# Patient Record
Sex: FEMALE | URBAN - METROPOLITAN AREA
[De-identification: names, ages, dates, MRNs, and addresses within clinical notes are randomized per-mention and may not be internally consistent; named-entity substitution may affect disease eponyms.]

---

## 2018-01-26 ENCOUNTER — TRANSFERRED RECORDS (OUTPATIENT)
Dept: HEALTH INFORMATION MANAGEMENT | Facility: CLINIC | Age: 28
End: 2018-01-26

## 2018-05-02 ENCOUNTER — DOCUMENTATION ONLY (OUTPATIENT)
Dept: LAB | Facility: CLINIC | Age: 28
End: 2018-05-02

## 2018-05-02 DIAGNOSIS — L85.3 ASTEATOSIS CUTIS: ICD-10-CM

## 2018-05-02 DIAGNOSIS — K59.00 CONSTIPATION: ICD-10-CM

## 2018-05-02 DIAGNOSIS — K29.01 ACUTE GASTRITIS WITH HEMORRHAGE: ICD-10-CM

## 2018-05-02 DIAGNOSIS — F41.9 ANXIETY HYPERVENTILATION: ICD-10-CM

## 2018-05-02 DIAGNOSIS — F45.8 ANXIETY HYPERVENTILATION: ICD-10-CM

## 2018-05-02 DIAGNOSIS — G31.84 MILD COGNITIVE IMPAIRMENT: ICD-10-CM

## 2018-05-02 DIAGNOSIS — B37.82 INTESTINAL CANDIDIASIS: ICD-10-CM

## 2018-05-02 DIAGNOSIS — R14.0 ABDOMINAL DISTENTION: Primary | ICD-10-CM

## 2018-05-02 DIAGNOSIS — R53.83 FATIGUE: ICD-10-CM

## 2018-05-02 LAB
ALBUMIN SERPL-MCNC: 4.1 G/DL (ref 3.4–5)
ALP SERPL-CCNC: 71 U/L (ref 40–150)
ALT SERPL W P-5'-P-CCNC: 26 U/L (ref 0–50)
ANION GAP SERPL CALCULATED.3IONS-SCNC: 7 MMOL/L (ref 3–14)
AST SERPL W P-5'-P-CCNC: 25 U/L (ref 0–45)
BASOPHILS # BLD AUTO: 0 10E9/L (ref 0–0.2)
BASOPHILS NFR BLD AUTO: 0.4 %
BILIRUB SERPL-MCNC: 0.3 MG/DL (ref 0.2–1.3)
BUN SERPL-MCNC: 7 MG/DL (ref 7–30)
CALCIUM SERPL-MCNC: 9 MG/DL (ref 8.5–10.1)
CHLORIDE SERPL-SCNC: 108 MMOL/L (ref 94–109)
CO2 SERPL-SCNC: 27 MMOL/L (ref 20–32)
CREAT SERPL-MCNC: 0.76 MG/DL (ref 0.52–1.04)
DIFFERENTIAL METHOD BLD: NORMAL
EOSINOPHIL # BLD AUTO: 0.1 10E9/L (ref 0–0.7)
EOSINOPHIL NFR BLD AUTO: 2.9 %
ERYTHROCYTE [DISTWIDTH] IN BLOOD BY AUTOMATED COUNT: 12.7 % (ref 10–15)
FERRITIN SERPL-MCNC: 28 NG/ML (ref 12–150)
GFR SERPL CREATININE-BSD FRML MDRD: >90 ML/MIN/1.7M2
GLUCOSE SERPL-MCNC: 93 MG/DL (ref 70–99)
HCT VFR BLD AUTO: 42.8 % (ref 35–47)
HGB BLD-MCNC: 14 G/DL (ref 11.7–15.7)
LYMPHOCYTES # BLD AUTO: 1.3 10E9/L (ref 0.8–5.3)
LYMPHOCYTES NFR BLD AUTO: 29.6 %
MCH RBC QN AUTO: 32.2 PG (ref 26.5–33)
MCHC RBC AUTO-ENTMCNC: 32.7 G/DL (ref 31.5–36.5)
MCV RBC AUTO: 98 FL (ref 78–100)
MONOCYTES # BLD AUTO: 0.4 10E9/L (ref 0–1.3)
MONOCYTES NFR BLD AUTO: 8.5 %
NEUTROPHILS # BLD AUTO: 2.6 10E9/L (ref 1.6–8.3)
NEUTROPHILS NFR BLD AUTO: 58.6 %
PLATELET # BLD AUTO: 248 10E9/L (ref 150–450)
POTASSIUM SERPL-SCNC: 4.3 MMOL/L (ref 3.4–5.3)
PROT SERPL-MCNC: 7.3 G/DL (ref 6.8–8.8)
RBC # BLD AUTO: 4.35 10E12/L (ref 3.8–5.2)
SODIUM SERPL-SCNC: 142 MMOL/L (ref 133–144)
WBC # BLD AUTO: 4.5 10E9/L (ref 4–11)

## 2018-05-02 PROCEDURE — 36415 COLL VENOUS BLD VENIPUNCTURE: CPT

## 2018-05-02 PROCEDURE — 99000 SPECIMEN HANDLING OFFICE-LAB: CPT

## 2018-05-02 PROCEDURE — 84630 ASSAY OF ZINC: CPT | Mod: 90

## 2018-05-02 PROCEDURE — 80053 COMPREHEN METABOLIC PANEL: CPT

## 2018-05-02 PROCEDURE — 82728 ASSAY OF FERRITIN: CPT

## 2018-05-02 PROCEDURE — 85025 COMPLETE CBC W/AUTO DIFF WBC: CPT

## 2018-05-02 PROCEDURE — 82525 ASSAY OF COPPER: CPT | Mod: 90

## 2018-05-02 NOTE — PROGRESS NOTES
Patient has lab appointment today at 8:45am, but no orders.  Please place orders, if needed.  Thank you Lab,

## 2018-05-05 LAB
COPPER SERPL-MCNC: 82 UG/DL (ref 80–155)
ZINC SERPL-MCNC: 76 UG/DL (ref 60–120)

## 2018-05-11 ENCOUNTER — OFFICE VISIT (OUTPATIENT)
Dept: OTOLARYNGOLOGY | Facility: CLINIC | Age: 28
End: 2018-05-11
Payer: COMMERCIAL

## 2018-05-11 DIAGNOSIS — R13.11 ORAL PHASE DYSPHAGIA: ICD-10-CM

## 2018-05-11 DIAGNOSIS — Q30.2 CLEFT LIP NASAL DEFORMITY: Primary | ICD-10-CM

## 2018-05-11 DIAGNOSIS — Q36.9 CLEFT LIP NASAL DEFORMITY: Primary | ICD-10-CM

## 2018-05-11 NOTE — LETTER
5/11/2018     RE: Raya Allen  [address]  [facility] [address]     Dear Colleague,    Thank you for referring your patient, Raya Allen, to the [facility]. Please see a copy of my visit note below.    Facial Plastic and Reconstructive Surgery Consultation    Referring Provider:   Lee Gallo MD  [address] [facility] [place] [address]    HPI:   I had the pleasure of seeing Raya Allen today in clinic for consultation for cleft lip deformity.    Raya Allen is a 28 year old female with a history of unilateral cleft lip s/p repair. 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     She has had recent surgery for this but continues to have noted scar tissue tethering and asymmetry.         Review Of Systems  ROS: 10 point ROS neg other than the symptoms noted above in the HPI.    There is no problem list on file for this patient.    No past surgical history on file.  No current outpatient prescriptions on file.     Review of patient's allergies indicates not on file.  Social History     Social History     Marital status: Single     Spouse name: N/A     Number of children: N/A     Years of education: N/A     Occupational History     Not on file.     Social History Main Topics     Smoking status: Not on file     Smokeless tobacco: Not on file     Alcohol use Not on file     Drug use: Not on file     Sexual activity: Not on file     Other Topics Concern     Not on file     Social History Narrative     No family history on file.    PE:   Cleft nasal and lip deformity s/p repair  Asymmetry in  Upper lip elevation with decreased dental show on the left  Scarring mid red lip on the left with whistle tip deformity  Scarring right cupid's bow, absent upper lip white roll                 IMPRESSION:    Cleft lip deformity  Whistle tip deformity  Oral phase  dysphagia  Dysarthria        PLAN:    Filler for lip deformity and scar    Raya has notable lip tissue absence and scarring from her cleft lip deformity. This would benefit from addition of volume to the lip which can be achieved in the office with an injectable.     I discussed the risks and benefits with Raya, primarily improved oral competence and the serious risk of intraarterial injection. She elected to proceed today.      Procedure: Hyaluronic  to the Oral Commissure  Indication: Oral phase dysphagia, cleft lip deformity  Injector: Dr. Tereza Garces    The risks and benefits associated with dermal filler were discussed. Informed consent was obtained.  The skin was cleaned with antimicrobial solution and a topical anesthetic and ice was placed.   27 gauge needle was used to inject the filler into the red and white lip on the left    Intermittent ice was used topically throughout the procedure. Hemostasis was obtained at the needle entry site with light digital pressure. Ice was then applied to the face by cool pack for 10 minutes. The skin was cleaned.    A total of 0.5 mls of Restylane filler was used on the affected side, left and mid upper lip  Please see scanned procedure log.    There were no complications and the patient tolerated the procedure well.    Photodocumentation was obtained.     Again, thank you for allowing me to participate in the care of your patient.      Sincerely,    Tereza Garces MD

## 2018-05-11 NOTE — PROGRESS NOTES
Facial Plastic and Reconstructive Surgery Consultation    Referring Provider:   Lee Gallo MD  [address] [place] [address]    HPI:   I had the pleasure of seeing Raya Allen today in clinic for consultation for cleft lip deformity.    Raya Allen is a 28 year old female with a history of unilateral cleft lip s/p repair. 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     She has had recent surgery for this but continues to have noted scar tissue tethering and asymmetry.         Review Of Systems  ROS: 10 point ROS neg other than the symptoms noted above in the HPI.    There is no problem list on file for this patient.    No past surgical history on file.  No current outpatient prescriptions on file.     Review of patient's allergies indicates not on file.  Social History     Social History     Marital status: Single     Spouse name: N/A     Number of children: N/A     Years of education: N/A     Occupational History     Not on file.     Social History Main Topics     Smoking status: Not on file     Smokeless tobacco: Not on file     Alcohol use Not on file     Drug use: Not on file     Sexual activity: Not on file     Other Topics Concern     Not on file     Social History Narrative     No family history on file.    PE:   Cleft nasal and lip deformity s/p repair  Asymmetry in  Upper lip elevation with decreased dental show on the left  Scarring mid red lip on the left with whistle tip deformity  Scarring right cupid's bow, absent upper lip white roll                 IMPRESSION:    Cleft lip deformity  Whistle tip deformity  Oral phase dysphagia  Dysarthria        PLAN:    Filler for lip deformity and scar    Raya has notable lip tissue absence and scarring from her cleft lip deformity. This would benefit from addition of volume to the lip which can be achieved in the office with an injectable.     I discussed the risks and benefits with  Raya, primarily improved oral competence and the serious risk of intraarterial injection. She elected to proceed today.      Procedure: Hyaluronic  to the Oral Commissure  Indication: Oral phase dysphagia, cleft lip deformity  Injector: Dr. Tereza Garces    The risks and benefits associated with dermal filler were discussed. Informed consent was obtained.  The skin was cleaned with antimicrobial solution and a topical anesthetic and ice was placed.   27 gauge needle was used to inject the filler into the red and white lip on the left    Intermittent ice was used topically throughout the procedure. Hemostasis was obtained at the needle entry site with light digital pressure. Ice was then applied to the face by cool pack for 10 minutes. The skin was cleaned.    A total of 0.5 mls of Restylane filler was used on the affected side, left and mid upper lip  Please see scanned procedure log.    There were no complications and the patient tolerated the procedure well.      Photodocumentation was obtained.

## 2018-05-11 NOTE — NURSING NOTE
2D and 3D photodocumentation obtained.   Obtained consent for Dermal Filler.  Will submit to insurance, if insurance denies, patient understands and agrees to pay for the filler.    Abram Friedman RN  5/11/2018 3:40 PM

## 2018-05-22 DIAGNOSIS — Z53.9 ERRONEOUS ENCOUNTER--DISREGARD: Primary | ICD-10-CM

## 2019-03-22 ENCOUNTER — OFFICE VISIT (OUTPATIENT)
Dept: PLASTIC SURGERY | Facility: CLINIC | Age: 29
End: 2019-03-22

## 2019-03-22 DIAGNOSIS — Q36.9 CLEFT LIP NASAL DEFORMITY: Primary | ICD-10-CM

## 2019-03-22 DIAGNOSIS — Q30.2 CLEFT LIP NASAL DEFORMITY: Primary | ICD-10-CM

## 2019-03-22 NOTE — LETTER
March 22, 2019  Re: Raya Aleln  1990    Dear Dr. Gardner,    Thank you so much for referring Raya Allen to the [facility]. I had the pleasure of visiting with Raya today.     Attached you will find a copy of my note. Please feel free to reach out to me with any questions, [phone].     Facial Plastic and Reconstructive Surgery      Raya Allen is a patient who is status post repair of a cleft lip and nasal deformity.   Raya has notable lip tissue absence and scarring from her cleft lip deformity. The had good correction of her lip recently with injectable filler.      I discussed the risks and benefits with Raya, primarily improved oral competence and the serious risk of intraarterial injection. She elected to proceed today.        Procedure: Hyaluronic  to the Oral Commissure  Indication: Oral phase dysphagia, cleft lip deformity  Injector: Dr. Tereza Garces     The risks and benefits associated with dermal filler were discussed. Informed consent was obtained.  The skin was cleaned with antimicrobial solution and a topical anesthetic and ice was placed.   27 gauge needle was used to inject the filler into the red and white lip on the left    Intermittent ice was used topically throughout the procedure. Hemostasis was obtained at the needle entry site with light digital pressure. Ice was then applied to the face by cool pack for 10 minutes. The skin was cleaned.     A total of 0. 4 mls of Restylane filler was used on the affected side, left and mid upper lip  Please see scanned procedure log.     There were no complications and the patient tolerated the procedure well.        Photodocumentation was obtained.              Your trust in our practice and care is much appreciated.    Sincerely,  Tereza Garces MD

## 2019-03-22 NOTE — PROGRESS NOTES
Facial Plastic and Reconstructive Surgery      Raya Allen is a patient who is status post repair of a cleft lip and nasal deformity.   Raya has notable lip tissue absence and scarring from her cleft lip deformity. The had good correction of her lip recently with injectable filler.      I discussed the risks and benefits with Raya, primarily improved oral competence and the serious risk of intraarterial injection. She elected to proceed today.        Procedure: Hyaluronic  to the Oral Commissure  Indication: Oral phase dysphagia, cleft lip deformity  Injector: Dr. Tereza Garces     The risks and benefits associated with dermal filler were discussed. Informed consent was obtained.  The skin was cleaned with antimicrobial solution and a topical anesthetic and ice was placed.   27 gauge needle was used to inject the filler into the red and white lip on the left    Intermittent ice was used topically throughout the procedure. Hemostasis was obtained at the needle entry site with light digital pressure. Ice was then applied to the face by cool pack for 10 minutes. The skin was cleaned.     A total of 0.4 mls of Restylane filler was used on the affected side, left and mid upper lip  Please see scanned procedure log.     There were no complications and the patient tolerated the procedure well.        Photodocumentation was obtained.

## 2019-03-22 NOTE — NURSING NOTE
Updated photodocumentation obtained.     Obtained written and verbal consent for Dermal Filler.  Discussed signs and symptoms of Intravascular occlusion and to call clinic immediately should there be any concern or questions.  Also discussed the other risk factors including but not limited to bruising, swelling, infection, soft tissue necrosis, blindness, pain, redness.    Prepped for procedure with [employer] Chlorhexidine topical solution to skin, applied topical Prilocaine and Lidocaine for pre-procedure numbing.  Used intermittent ice for comfort.  Patient tolerated procedure well.     Post procedure instructions discussed with patient.  No make-up for 4 hours, drink plenty of water to stay hydrated.  Avoid any retinols for 48 hours.  Hold off off on any skin treatments such as Microneedling or Hydrafacials for 5 days after unless directed otherwise by your physician.    Abram Friedman RN  3/22/2019 11:33 AM

## 2019-03-22 NOTE — LETTER
3/22/2019       RE: Raya Allen  [address] [facility] [address]     Dear Colleague,    Thank you for referring your patient, Raya Allen, to the [facility]. Please see a copy of my visit note below.    Facial Plastic and Reconstructive Surgery      Raya Allen is a patient who is status post repair of a cleft lip and nasal deformity.   Raya has notable lip tissue absence and scarring from her cleft lip deformity. The had good correction of her lip recently with injectable filler.      I discussed the risks and benefits with Raya, primarily improved oral competence and the serious risk of intraarterial injection. She elected to proceed today.        Procedure: Hyaluronic  to the Oral Commissure  Indication: Oral phase dysphagia, cleft lip deformity  Injector: Dr. Tereza Garces     The risks and benefits associated with dermal filler were discussed. Informed consent was obtained.  The skin was cleaned with antimicrobial solution and a topical anesthetic and ice was placed.   27 gauge needle was used to inject the filler into the red and white lip on the left    Intermittent ice was used topically throughout the procedure. Hemostasis was obtained at the needle entry site with light digital pressure. Ice was then applied to the face by cool pack for 10 minutes. The skin was cleaned.     A total of 0. 4 mls of Restylane filler was used on the affected side, left and mid upper lip  Please see scanned procedure log.     There were no complications and the patient tolerated the procedure well.        Photodocumentation was obtained.            Again, thank you for allowing me to participate in the care of your patient.      Sincerely,    Tereza Garces MD

## 2020-07-27 ENCOUNTER — OFFICE VISIT (OUTPATIENT)
Dept: PLASTIC SURGERY | Facility: CLINIC | Age: 30
End: 2020-07-27
Payer: COMMERCIAL

## 2020-07-27 DIAGNOSIS — J34.89 NASAL VALVE STENOSIS: Primary | ICD-10-CM

## 2020-07-27 NOTE — LETTER
7/27/2020       RE: Raya Olmedo  [address] [facility] [address]     Dear Colleague,    Thank you for referring your patient, Raya Olmedo, to the [facility] at [facility]. Please see a copy of my visit note below.    This office note has been dictated.     Again, thank you for allowing me to participate in the care of your patient.      Sincerely,    ALEA RILEY MD

## 2020-07-27 NOTE — LETTER
2020      RE: Darryn Sam  [address] [facility] [address]       Service Date: 2020      HISTORY OF PRESENT ILLNESS:  Darryn is in and since last seen, she has gotten .  She now has a five month old at home as well.  She has some left-sided nasal obstruction, but it is certainly better than where we started.  She would like to see if her lip contour can be improved as there is asymmetry between the right and left sides, particularly when she smiles.        PHYSICAL EXAMINATION:  Indeed the graft at the alar base on the left side I think does impede the upward motion of the lip a bit, but it is essential in order to create appropriate nasal contour.  There is a bit of hooding of the vermillion border in the middle third of the left upper lip and there is lack of volume along the vermillion border so the lip contour at the vermillion junction is less than idea.        DISCUSSION:  We talked about an incision along the vermillion border to elevate it slightly on the left side.  I think I would like to avoid further scarring in her lip.  I would like to see what Dr. Tereza Garces could do with further injection treatment.  Intranasally, I could trim off a small amount of redundant cartilage from an endonasal approach on the left side to take out a bit of cartilage bulk that creates some degree of obstruction, but the amount of change would be fairly limited.        PLAN:  She will reflect on this.  I will have her see my partner and she will get back to me.         ALEA RILEY MD             D: 2020   T: 2020   MT: ms      Name:     DARRYN SAM   MRN:      [id number]        Account:      [id number]   :      1990           Service Date: 2020      Document: [id number]

## 2020-07-27 NOTE — LETTER
July 27, 2020  Re: Raya Olmedo  1990    Dear Dr. Gardner,    Thank you so much for referring Raya Olmedo to the [facility]. I had the pleasure of visiting with Raya today.     Attached you will find a copy of my note. Please feel free to reach out to me with any questions, [phone].     This office note has been dictated.       Your trust in our practice and care is much appreciated.    Sincerely,  ALEA RILEY MD

## 2020-07-28 NOTE — PROGRESS NOTES
Service Date: 2020      HISTORY OF PRESENT ILLNESS:  Darryn is in and since last seen, she has gotten .  She now has a five month old at home as well.  She has some left-sided nasal obstruction, but it is certainly better than where we started.  She would like to see if her lip contour can be improved as there is asymmetry between the right and left sides, particularly when she smiles.        PHYSICAL EXAMINATION:  Indeed the graft at the alar base on the left side I think does impede the upward motion of the lip a bit, but it is essential in order to create appropriate nasal contour.  There is a bit of hooding of the vermillion border in the middle third of the left upper lip and there is lack of volume along the vermillion border so the lip contour at the vermillion junction is less than idea.        DISCUSSION:  We talked about an incision along the vermillion border to elevate it slightly on the left side.  I think I would like to avoid further scarring in her lip.  I would like to see what Dr. Tereza Garces could do with further injection treatment.  Intranasally, I could trim off a small amount of redundant cartilage from an endonasal approach on the left side to take out a bit of cartilage bulk that creates some degree of obstruction, but the amount of change would be fairly limited.        PLAN:  She will reflect on this.  I will have her see my partner and she will get back to me.         ALEA RILEY MD             D: 2020   T: 2020   MT: ms      Name:     DARRYN SAM   MRN:      [id number]        Account:      [id number]   :      1990           Service Date: 2020      Document: [id number]

## 2020-09-25 ENCOUNTER — OFFICE VISIT (OUTPATIENT)
Dept: PLASTIC SURGERY | Facility: CLINIC | Age: 30
End: 2020-09-25
Payer: COMMERCIAL

## 2020-09-25 ENCOUNTER — TELEPHONE (OUTPATIENT)
Dept: PLASTIC SURGERY | Facility: CLINIC | Age: 30
End: 2020-09-25

## 2020-09-25 DIAGNOSIS — Q36.9 CLEFT LIP NASAL DEFORMITY: Primary | ICD-10-CM

## 2020-09-25 DIAGNOSIS — Q30.2 CLEFT LIP NASAL DEFORMITY: Primary | ICD-10-CM

## 2020-09-25 NOTE — TELEPHONE ENCOUNTER
Facial Plastic and Reconstructive Surgery      I called [facility] for follow up   I left her a voice mail discussing lip lift as an option but possible need to wait based on her pregnancy.  Procedures can be performed under local in pregnancy, however we tend to do lip lifts with some sedation.     I did not discuss, but she would need to know that prior auth would be needed.

## 2020-09-25 NOTE — PROGRESS NOTES
Facial Plastic and Reconstructive Surgery      Raya Kumarpastora comes in today for discussion of lip asymmetry. She has a left sided cleft lip deformity that has been repaired.    She had nasal reconstructive surgery with placement of a premaxillary graft. She  Notes that since that surgery she the left side is more dependent. She notices it takes more effort to raise that side of her lip and she has to think about it consciously. She feels quite self aware about this and states that is has led her to limit picture taking.    On exam she with speech she has a notable dependence of the lower lip on the left. With an open mouth it is notable that her dental show is asymmetric and the left incisors are obstructed by the red lip. With full smile the asymmetry is improved.     I marked out what one could excise of the dependence as often is done with facial paralysis, however at rest this leads to too much reduction in red lip height in the setting of her cleft deformity.    I think removing the premaxillary graft could potentially lead to secondarily unfavorable changes to her nose and may not improve the elevation on the left.     I have discussed this with Lee Gallo and we both believe a possible option is a subnasal lip lift on the left. I will call Raya and discuss this with her.     She is pregnant, early approx 9 weeks and thus any procedure would likely have to wait.      I spent a total of 30 minutes face-to-face with Raya Olmedo during today's office visit.  Over 50% of this time was spent counseling the patient and/or coordinating care regarding cleft lip deformity.  See note for details.

## 2020-09-25 NOTE — NURSING NOTE
Updated photodocumentation obtained.    Dr. Garces will consult with Dr. Gallo to determine next steps and potential surgical options for pt.    Pt. Will call me next week to follow up on the plan.    Denae St RN  9/25/2020 12:47 PM

## 2020-09-25 NOTE — LETTER
9/25/2020       RE: Raya Olmedo  [address] [facility] [address]     Dear Colleague,    Thank you for referring your patient, Raya Olmedo, to the [facility] at [facility] Please see a copy of my visit note below.    Facial Plastic and Reconstructive Surgery      Raya Olmedo comes in today for discussion of lip asymmetry. She has a left sided cleft lip deformity that has been repaired.    She had nasal reconstructive surgery with placement of a premaxillary graft. She  Notes that since that surgery she the left side is more dependent. She notices it takes more effort to raise that side of her lip and she has to think about it consciously. She feels quite self aware about this and states that is has led her to limit picture taking.    On exam she with speech she has a notable dependence of the lower lip on the left. With an open mouth it is notable that her dental show is asymmetric and the left incisors are obstructed by the red lip. With full smile the asymmetry is improved.     I marked out what one could excise of the dependence as often is done with facial paralysis, however at rest this leads to too much reduction in red lip height in the setting of her cleft deformity.    I think removing the premaxillary graft could potentially lead to secondarily unfavorable changes to her nose and may not improve the elevation on the left.     I have discussed this with Lee Gallo and we both believe a possible option is a subnasal lip lift on the left. I will call Raya and discuss this with her.     She is pregnant, early approx 9 weeks and thus any procedure would likely have to wait.      I spent a total of 30 minutes face-to-face with Raya Olmedo during today's office visit.  Over 50% of this time was spent counseling the patient and/or coordinating care regarding cleft lip deformity.  See note for details.      Again, thank you for allowing me to  participate in the care of your patient.      Sincerely,    Tereza Garces MD

## 2020-09-25 NOTE — LETTER
September 25, 2020  Re: Raya Olmedo  1990    Dear Dr. Gallo,    Thank you so much for referring Raya Olmedo to the [facility]. I had the pleasure of visiting with Raya today.     Attached you will find a copy of my note. Please feel free to reach out to me with any questions, [phone].     Facial Plastic and Reconstructive Surgery      Raya Olmedo comes in today for discussion of lip asymmetry. She has a left sided cleft lip deformity that has been repaired.    She had nasal reconstructive surgery with placement of a premaxillary graft. She  Notes that since that surgery she the left side is more dependent. She notices it takes more effort to raise that side of her lip and she has to think about it consciously. She feels quite self aware about this and states that is has led her to limit picture taking.    On exam she with speech she has a notable dependence of the lower lip on the left. With an open mouth it is notable that her dental show is asymmetric and the left incisors are obstructed by the red lip. With full smile the asymmetry is improved.     I marked out what one could excise of the dependence as often is done with facial paralysis, however at rest this leads to too much reduction in red lip height in the setting of her cleft deformity.    I think removing the premaxillary graft could potentially lead to secondarily unfavorable changes to her nose and may not improve the elevation on the left.     I have discussed this with Lee Gallo and we both believe a possible option is a subnasal lip lift on the left. I will call Raya and discuss this with her.     She is pregnant, early approx 9 weeks and thus any procedure would likely have to wait.      I spent a total of 30 minutes face-to-face with Raya Olmedo during today's office visit.  Over 50% of this time was spent counseling the patient and/or coordinating care regarding cleft lip deformity.  See note for details.    Your  trust in our practice and care is much appreciated.    Sincerely,  Tereza Garces MD

## 2020-10-06 ENCOUNTER — TELEPHONE (OUTPATIENT)
Dept: PLASTIC SURGERY | Facility: CLINIC | Age: 30
End: 2020-10-06

## 2020-10-06 NOTE — TELEPHONE ENCOUNTER
I spoke to Raya to let her know Dr. Velasco and Dr. Johnson Wiggins had some surgical ideas to run by her.  She would like to discuss further and I will have Dr. Johnson Philippe's nurse follow up with a phone call to her this week.  Maria Guadalupe Oshea, Patient Coordinator

## 2021-10-01 ENCOUNTER — OFFICE VISIT (OUTPATIENT)
Dept: PLASTIC SURGERY | Facility: CLINIC | Age: 31
End: 2021-10-01
Payer: COMMERCIAL

## 2021-10-01 DIAGNOSIS — Q36.9 CLEFT LIP NASAL DEFORMITY: ICD-10-CM

## 2021-10-01 DIAGNOSIS — J34.89 NASAL OBSTRUCTION: Primary | ICD-10-CM

## 2021-10-01 DIAGNOSIS — L90.5 SCAR OF LIP: ICD-10-CM

## 2021-10-01 DIAGNOSIS — Q30.2 CLEFT LIP NASAL DEFORMITY: ICD-10-CM

## 2021-10-01 NOTE — LETTER
October 1, 2021  Re: Raya Olmedo  1990    Dear Dr. Gardner,    Thank you so much for referring Raya Olmedo to the [facility]. I had the pleasure of visiting with Raya today.     Attached you will find a copy of my note. Please feel free to reach out to me with any questions, [phone].     Facial Plastic and Reconstructive Surgery      Raya Olmedo has a history of cleft lip and palate and has had repair in addition to rhinoplasty with revision. She had last sought care due to a dependent upper lip and in consultation with her and Lee Gallo, we discussed performing a subnasal lip lift at the time.     She was interested but was pregnant at the visit. Her child is now 5 months old.     When we discuss and I assess her today, I do not think it is upper lip elevation that is needed but more of a reduction of the lip scar. This dependent scar hoods over her teeth and gets in the way with her speech.     I have recommended resection of the scar at the cleft lip area of the upper lip. I would do this in clinic.     We also discussed her nose and the columellar shifting to the right, she feels like with the increased rotation of the nose, her nostril asymmetry is more noticeable. She also continues to have obstruction on the left, there is a shelf of tissue at the internal nasal valve that when elevated leads to improvement in her breathing.     I spent a total of 15 minutes in the care of patient Raya Olmedo during today's office visit. This time includes reviewing the patient's chart and prior history, obtaining a history, performing an examination and evaluation and counseling the patient. This time also includes ordering mediations or tests necessary in addition to communication to other member's of the patient's health care team. Time spent in documentation and care coordination is included.           Your trust in our practice and care is much appreciated.    Sincerely,  Tereza Garces MD

## 2021-10-01 NOTE — PROGRESS NOTES
Facial Plastic and Reconstructive Surgery      Raya Olmedo has a history of cleft lip and palate and has had repair in addition to rhinoplasty with revision. She had last sought care due to a dependent upper lip and in consultation with her and Lee Gallo, we discussed performing a subnasal lip lift at the time.     She was interested but was pregnant at the visit. Her child is now 5 months old.     When we discuss and I assess her today, I do not think it is upper lip elevation that is needed but more of a reduction of the lip scar. This dependent scar hoods over her teeth and gets in the way with her speech.     I have recommended resection of the scar at the cleft lip area of the upper lip. I would do this in clinic.     We also discussed her nose and the columellar shifting to the right, she feels like with the increased rotation of the nose, her nostril asymmetry is more noticeable. She also continues to have obstruction on the left, there is a shelf of tissue at the internal nasal valve that when elevated leads to improvement in her breathing.     I spent a total of 15 minutes in the care of patient Raya Olmedo during today's office visit. This time includes reviewing the patient's chart and prior history, obtaining a history, performing an examination and evaluation and counseling the patient. This time also includes ordering mediations or tests necessary in addition to communication to other member's of the patient's health care team. Time spent in documentation and care coordination is included.

## 2021-10-01 NOTE — LETTER
10/1/2021       RE: Raya Olmedo  [address] [address]     Dear Colleague,    Thank you for referring your patient, Raya Olmedo, to the [facility] at [facility]. Please see a copy of my visit note below.    Facial Plastic and Reconstructive Surgery      Raya Olmedo has a history of cleft lip and palate and has had repair in addition to rhinoplasty with revision. She had last sought care due to a dependent upper lip and in consultation with her and Lee Gallo, we discussed performing a subnasal lip lift at the time.     She was interested but was pregnant at the visit. Her child is now 5 months old.     When we discuss and I assess her today, I do not think it is upper lip elevation that is needed but more of a reduction of the lip scar. This dependent scar hoods over her teeth and gets in the way with her speech.     I have recommended resection of the scar at the cleft lip area of the upper lip. I would do this in clinic.     We also discussed her nose and the columellar shifting to the right, she feels like with the increased rotation of the nose, her nostril asymmetry is more noticeable. She also continues to have obstruction on the left, there is a shelf of tissue at the internal nasal valve that when elevated leads to improvement in her breathing.     I spent a total of 15 minutes in the care of patient Raya Olmedo during today's office visit. This time includes reviewing the patient's chart and prior history, obtaining a history, performing an examination and evaluation and counseling the patient. This time also includes ordering mediations or tests necessary in addition to communication to other member's of the patient's health care team. Time spent in documentation and care coordination is included.         Again, thank you for allowing me to participate in the care of your patient.      Sincerely,    Tereza Garces  MD

## 2021-10-01 NOTE — LETTER
10/1/2021       RE: Raya Olmedo  [address] [address]     Dear Colleague,    Thank you for referring your patient, Raya Olmedo, to the [facility] at [facility]. Please see a copy of my visit note below.    Facial Plastic and Reconstructive Surgery      Raya Olmedo has a history of cleft lip and palate and has had repair in addition to rhinoplasty with revision. She had last sought care due to a dependent upper lip and in consultation with her and Lee Gallo, we discussed performing a subnasal lip lift at the time.     She was interested but was pregnant at the visit. Her child is now 5 months old.     When we discuss and I assess her today, I do not think it is upper lip elevation that is needed but more of a reduction of the lip scar. This dependent scar hoods over her teeth and gets in the way with her speech.     I have recommended resection of the scar at the cleft lip area of the upper lip. I would do this in clinic.     We also discussed her nose and the columellar shifting to the right, she feels like with the increased rotation of the nose, her nostril asymmetry is more noticeable. She also continues to have obstruction on the left, there is a shelf of tissue at the internal nasal valve that when elevated leads to improvement in her breathing.     I spent a total of 15 minutes in the care of patient Raya Olmedo during today's office visit. This time includes reviewing the patient's chart and prior history, obtaining a history, performing an examination and evaluation and counseling the patient. This time also includes ordering mediations or tests necessary in addition to communication to other member's of the patient's health care team. Time spent in documentation and care coordination is included.         Again, thank you for allowing me to participate in the care of your patient.      Sincerely,    Tereza Garces  MD

## 2022-12-16 ENCOUNTER — OFFICE VISIT (OUTPATIENT)
Dept: PLASTIC SURGERY | Facility: CLINIC | Age: 32
End: 2022-12-16
Payer: COMMERCIAL

## 2022-12-16 DIAGNOSIS — Q30.2 CLEFT LIP NASAL DEFORMITY: ICD-10-CM

## 2022-12-16 DIAGNOSIS — L90.5 SCAR OF LIP: Primary | ICD-10-CM

## 2022-12-16 DIAGNOSIS — Q36.9 CLEFT LIP NASAL DEFORMITY: ICD-10-CM

## 2022-12-16 NOTE — LETTER
12/16/2022       RE: Raya Olmedo  [address] [place] [address]     Dear Colleague,    Thank you for referring your patient, Raya Olmedo, to the [facility] at [facility]. Please see a copy of my visit note below.    Facial Plastic and Reconstructive Surgery      Raya Olmedo presents today for follow-up.  She continues to have difficulty elevating the left upper lip which is her cleft side.  She has asymmetry when she smiles also when she speaks and his mid smile.  At full extreme of smell there is increased symmetry but she does have decreased dental show on the left.  She is interested in discussing other possible options for this.    We reviewed her images today.  She has concerns that she feels there were significant changes made in 2018 that she feels have changed some of the specifics of her nose and also the introduction of the graft on the left has led to this lip concern.  She does have and interested in seeing if she could benefit from any intervention on the lips.    We discussed that a couple millimeters excision of the lip tissue on the left at the site of the cleft lip repair would be beneficial.  We discussed that this could be performed in clinic.  She would be interested in proceeding with this.    I spent a total of 45 minutes in the care of patient Raya Olmedo during today's office visit. This time includes reviewing the patient's chart and prior history, obtaining a history, performing an examination and evaluation and counseling the patient. Time spent in documentation and care coordination is included.       Sincerely,    Tereza Garces MD

## 2022-12-16 NOTE — PROGRESS NOTES
Facial Plastic and Reconstructive Surgery      Raya Olmedo presents today for follow-up.  She continues to have difficulty elevating the left upper lip which is her cleft side.  She has asymmetry when she smiles also when she speaks and his mid smile.  At full extreme of smell there is increased symmetry but she does have decreased dental show on the left.  She is interested in discussing other possible options for this.    We reviewed her images today.  She has concerns that she feels there were significant changes made in 2018 that she feels have changed some of the specifics of her nose and also the introduction of the graft on the left has led to this lip concern.  She does have and interested in seeing if she could benefit from any intervention on the lips.    We discussed that a couple millimeters excision of the lip tissue on the left at the site of the cleft lip repair would be beneficial.  We discussed that this could be performed in clinic.  She would be interested in proceeding with this.    I spent a total of 45 minutes in the care of patient Raya Olmedo during today's office visit. This time includes reviewing the patient's chart and prior history, obtaining a history, performing an examination and evaluation and counseling the patient. Time spent in documentation and care coordination is included.